# Patient Record
Sex: MALE | Race: WHITE | NOT HISPANIC OR LATINO | Employment: UNEMPLOYED | URBAN - METROPOLITAN AREA
[De-identification: names, ages, dates, MRNs, and addresses within clinical notes are randomized per-mention and may not be internally consistent; named-entity substitution may affect disease eponyms.]

---

## 2017-08-01 ENCOUNTER — OFFICE VISIT (OUTPATIENT)
Dept: HEMATOLOGY/ONCOLOGY | Facility: CLINIC | Age: 52
End: 2017-08-01
Payer: COMMERCIAL

## 2017-08-01 VITALS
HEART RATE: 83 BPM | OXYGEN SATURATION: 97 % | HEIGHT: 71 IN | DIASTOLIC BLOOD PRESSURE: 64 MMHG | BODY MASS INDEX: 31.64 KG/M2 | TEMPERATURE: 98 F | SYSTOLIC BLOOD PRESSURE: 102 MMHG | WEIGHT: 226 LBS

## 2017-08-01 DIAGNOSIS — C78.7 SECONDARY CARCINOMA OF LIVER: ICD-10-CM

## 2017-08-01 DIAGNOSIS — C25.9 PANCREATIC CARCINOMA: Primary | ICD-10-CM

## 2017-08-01 PROCEDURE — 3008F BODY MASS INDEX DOCD: CPT | Mod: S$GLB,,, | Performed by: INTERNAL MEDICINE

## 2017-08-01 PROCEDURE — 99205 OFFICE O/P NEW HI 60 MIN: CPT | Mod: S$GLB,,, | Performed by: INTERNAL MEDICINE

## 2017-08-01 PROCEDURE — 99999 PR PBB SHADOW E&M-NEW PATIENT-LVL III: CPT | Mod: PBBFAC,,, | Performed by: INTERNAL MEDICINE

## 2017-08-01 RX ORDER — DULOXETIN HYDROCHLORIDE 30 MG/1
CAPSULE, DELAYED RELEASE ORAL
COMMUNITY
Start: 2017-07-06

## 2017-08-01 RX ORDER — MIRTAZAPINE 30 MG/1
30 TABLET, FILM COATED ORAL NIGHTLY
COMMUNITY

## 2017-08-01 RX ORDER — METFORMIN HYDROCHLORIDE 1000 MG/1
1000 TABLET ORAL 2 TIMES DAILY WITH MEALS
COMMUNITY

## 2017-08-01 RX ORDER — ESOMEPRAZOLE MAGNESIUM 40 MG/1
40 CAPSULE, DELAYED RELEASE ORAL
COMMUNITY

## 2017-08-01 RX ORDER — LISINOPRIL AND HYDROCHLOROTHIAZIDE 12.5; 2 MG/1; MG/1
TABLET ORAL
COMMUNITY
Start: 2017-07-05

## 2017-08-01 RX ORDER — INSULIN DEGLUDEC 100 U/ML
INJECTION, SOLUTION SUBCUTANEOUS
COMMUNITY
Start: 2017-07-19

## 2017-08-07 PROBLEM — C25.9 PANCREATIC CARCINOMA: Status: ACTIVE | Noted: 2017-08-07

## 2017-08-07 PROBLEM — C78.7 SECONDARY CARCINOMA OF LIVER: Status: ACTIVE | Noted: 2017-08-07

## 2017-08-07 NOTE — PROGRESS NOTES
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  I have reviewed all of the patient's clinical history available in the medical record in care everywhere and have utilized this in my evaluation and management recommendations today.  I have also reviewed the patient's paper medical records which she brought with him to the visit today.  The patient has previously been evaluated by Dr. Barry Peoples at [facility] in [place], [place] and is present in the care of Dr. Chancellor Brennan in [place], [place].  Today the patient reports that overall he feels okay.  He denies any significant neoplasm related pain.  He denies any chest pain or shortness of breath.  He denies any melena, hematochezia, hematemesis, hemoptysis or hematuria.  He denies any bowel or urinary complaints.  He denies any fever, chills or night sweats.  The patient's prior treatment has included distal [subtotal] pancreectomy/splenectomy and regional abdominal lymphadenectomy in August 2016.  This was followed by adjuvant gemcitabine.  He was then noted to have recurrence in his liver which was proven by biopsy.  He subsequently completed 10 treatments with FOLFIRINOX for first line palliative therapy.  However subsequent follow up imaging has shown progression of metastatic disease in the liver.  The patient is exploring his treatment options at [facility] and with me with regard to next best step in management.    PAST MEDICAL HISTORY:   1.  Hypertension  2.  Gout    SURGICAL HISTORY:   1.  Distal pancreatectomy/splenectomy with regional abdominal lymphadenectomy in August 2016    FAMILY HISTORY: The patient's  mother had breast cancer the age of 70.  His grandmother also had breast cancer.  A maternal aunt had breast cancer.  He denies any other immediate family members with cancer or bleeding/clotting disorders.    SOCIAL HISTORY: He reports 100+ pack year smoking history and quit in 2016.  He drink alcohol socially.  He denies any recreational drug use.  He is self-employed.  He lives in [place], [place].    ALLERGIES: [NKDA]    MEDICATIONS: [Medcard has been reviewed and/or reconciled.]    REVIEW OF SYSTEMS:   GENERAL: [No fevers, chills or sweats. No fatigue, weight loss or loss of appetite.]  HEENT: [No blurred vision, tinnitus, nasal discharge, sorethroat or dysphagia.]  HEART: [No chest pain, palpitations or shortness of breath.]    LUNGS: [No cough, hemoptysis or breathing problems.]  ABDOMEN: [No abdominal pain, nausea, vomiting, diarrhea, constipation or melena.]  GENITOURINARY: [No dysuria, bleeding or malodorous discharge.]  NEURO: [No headache, dizziness or vertigo.]  HEMATOLOGY: [No easy bruising, spontaneous bleeding or blood clots in the past].  MUSCULOSKELETAL: [No arthralgias, myalgias or bone pains.]  SKIN: [No rashes or skin lesions.]  PSYCHIATRY: [No depression or anxiety.]    PHYSICAL EXAMINATION:   VS: Reviewed on nurse's notes.  APPEARANCE: The patient is a well-developed, well-nourished and well-groomed  male who appears in no acute distress.  He was accompanied to this clinic visit by his wife and daughter.  HEENT: No scleral icterus. Both external auditory canals clear. No oral ulcers, lesions. Throat clear  HEAD: No sinus tenderness.  NECK: Supple. No palpable lymphadenopathy. Thyroid non-tender, no palpable masses  CHEST: Breath sounds clear bilaterally. No rales. No rhonchi. Unlabored respirations.  CARDIOVASCULAR: Normal S1, S2. Normal rate. Regular rhythm.  ABDOMEN: Bowel sounds normal. No tenderness. No abdominal distention. No hepatomegaly.   LYMPHATIC: No palpable supraclavicular,  axillary nodes  EXTREMITIES: No clubbing, cyanosis, edema  SKIN: No lesions. No petechiae. No ecchymoses. No induration or nodules  NEUROLOGIC: No focal findings. Alert & Oriented x 3. Mood appropriate to affect    LABS:   Reviewed in outside medical records    IMAGING:  Reviewed in outside medical records    IMPRESSION:  1.  Metastatic adenosquamous carcinoma of the pancreatic tail    PLAN:  1.  I had a detailed discussion with the patient today with regard to his current clinical situation.  He understands the usually poor prognosis of his metastatic malignancy.  2.  The patient is awaiting a call back from [facility] with regard to an appointment.  I will review options for his eligibility for enrollment in a clinical trial at [facility] either in [place] or [place].  3.  I have discussed that outside of the above options/outside of clinical trial my recommendation would be to proceed with gemcitabine/Abraxane for second line palliative chemotherapy.  He expressed understanding.  4.  The patient and his family members had many questions and all of these were answered to their satisfaction today.    Further evaluation/management/follow-up recommendations will be determined based on the above.  He knows to call for any additional questions or new problems.    I spent greater than 60 minutes face-to-face with the patient discussing and reviewing all of the above including review of outside medical records with greater than 50% of this time spent in counseling.    Shon Beltran MD

## 2017-08-14 ENCOUNTER — TELEPHONE (OUTPATIENT)
Dept: HEMATOLOGY/ONCOLOGY | Facility: CLINIC | Age: 52
End: 2017-08-14

## 2017-08-14 NOTE — TELEPHONE ENCOUNTER
----- Message from Shon Beltran MD sent at 8/14/2017 11:08 AM CDT -----  Contact: mary Espinal-  [phone]  until 11:00    after 11:00 [phone]  Please call and see what the update is. Thank you  ----- Message -----  From: Sharmin Mary MA  Sent: 8/14/2017  10:40 AM  To: Shon Beltran MD        ----- Message -----  From: Radha Mazariegos  Sent: 8/14/2017   9:57 AM  To: Devin Menendez Staff    Pt went to [facility].  Calling to discuss treatment plan.